# Patient Record
Sex: MALE | Race: WHITE | URBAN - METROPOLITAN AREA
[De-identification: names, ages, dates, MRNs, and addresses within clinical notes are randomized per-mention and may not be internally consistent; named-entity substitution may affect disease eponyms.]

---

## 2023-08-16 ENCOUNTER — OFFICE VISIT (OUTPATIENT)
Dept: FAMILY MEDICINE CLINIC | Facility: CLINIC | Age: 33
End: 2023-08-16

## 2023-08-16 VITALS
WEIGHT: 192 LBS | HEIGHT: 67.5 IN | DIASTOLIC BLOOD PRESSURE: 85 MMHG | SYSTOLIC BLOOD PRESSURE: 128 MMHG | OXYGEN SATURATION: 99 % | BODY MASS INDEX: 29.78 KG/M2 | HEART RATE: 95 BPM | TEMPERATURE: 98 F

## 2023-08-16 DIAGNOSIS — R06.2 WHEEZING: ICD-10-CM

## 2023-08-16 DIAGNOSIS — R05.1 ACUTE COUGH: Primary | ICD-10-CM

## 2023-08-16 PROCEDURE — 3079F DIAST BP 80-89 MM HG: CPT | Performed by: FAMILY MEDICINE

## 2023-08-16 PROCEDURE — 3074F SYST BP LT 130 MM HG: CPT | Performed by: FAMILY MEDICINE

## 2023-08-16 PROCEDURE — 3008F BODY MASS INDEX DOCD: CPT | Performed by: FAMILY MEDICINE

## 2023-08-16 PROCEDURE — 99202 OFFICE O/P NEW SF 15 MIN: CPT | Performed by: FAMILY MEDICINE

## 2023-08-16 RX ORDER — BENZONATATE 100 MG/1
100 CAPSULE ORAL 3 TIMES DAILY PRN
Qty: 30 CAPSULE | Refills: 0 | Status: SHIPPED | OUTPATIENT
Start: 2023-08-16

## 2023-08-16 RX ORDER — ALBUTEROL SULFATE 90 UG/1
2 AEROSOL, METERED RESPIRATORY (INHALATION) EVERY 4 HOURS PRN
Qty: 1 EACH | Refills: 0 | Status: SHIPPED | OUTPATIENT
Start: 2023-08-16

## 2024-04-04 ENCOUNTER — OFFICE VISIT (OUTPATIENT)
Dept: FAMILY MEDICINE CLINIC | Facility: CLINIC | Age: 34
End: 2024-04-04

## 2024-04-04 VITALS
TEMPERATURE: 99 F | WEIGHT: 194 LBS | SYSTOLIC BLOOD PRESSURE: 132 MMHG | HEIGHT: 67.5 IN | HEART RATE: 76 BPM | DIASTOLIC BLOOD PRESSURE: 76 MMHG | BODY MASS INDEX: 30.09 KG/M2

## 2024-04-04 DIAGNOSIS — S39.012A STRAIN OF LUMBAR REGION, INITIAL ENCOUNTER: Primary | ICD-10-CM

## 2024-04-04 PROCEDURE — 3078F DIAST BP <80 MM HG: CPT | Performed by: FAMILY MEDICINE

## 2024-04-04 PROCEDURE — 3075F SYST BP GE 130 - 139MM HG: CPT | Performed by: FAMILY MEDICINE

## 2024-04-04 PROCEDURE — 99213 OFFICE O/P EST LOW 20 MIN: CPT | Performed by: FAMILY MEDICINE

## 2024-04-04 PROCEDURE — 3008F BODY MASS INDEX DOCD: CPT | Performed by: FAMILY MEDICINE

## 2024-04-04 RX ORDER — CYCLOBENZAPRINE HCL 5 MG
5 TABLET ORAL NIGHTLY PRN
Qty: 30 TABLET | Refills: 0 | Status: SHIPPED | OUTPATIENT
Start: 2024-04-04

## 2024-04-04 NOTE — PROGRESS NOTES
HPI:    Patient ID: Gilberto Rutherford is a 33 year old male.      Low Back Pain  Pertinent negatives include no fever, numbness or weakness.       Chief Complaint   Patient presents with    Low Back Pain     X 3 weeks ago possible bulging disk started while stretching his back taking Tylenol        Wt Readings from Last 6 Encounters:   04/04/24 194 lb (88 kg)   08/16/23 192 lb (87.1 kg)     BP Readings from Last 3 Encounters:   04/04/24 132/76   08/16/23 128/85     Low back pain , more on left side and some times radiates to left leg.  Started 3 weeks ago.  Denies any injury.    Works as a technician. Sits at work.  [place] he couldn't stand straight few days ago  Did some back exercises that helped.  Doing heat compresses as well.    Took some tylenol that helped some  Pain currently 3/10    Nu numbness tingling weakness in legs     Review of Systems   Constitutional:  Negative for chills and fever.   Musculoskeletal:  Positive for back pain and myalgias. Negative for arthralgias, gait problem, joint swelling, neck pain and neck stiffness.   Neurological:  Negative for weakness and numbness.       /76   Pulse 76   Temp 99 °F (37.2 °C) (Temporal)   Ht 5' 7.5\" (1.715 m)   Wt 194 lb (88 kg)   BMI 29.94 kg/m²          Current Outpatient Medications   Medication Sig Dispense Refill    cyclobenzaprine 5 MG Oral Tab Take 1 tablet (5 mg total) by mouth nightly as needed. Caution sedation 30 tablet 0     Allergies:No Known Allergies   PHYSICAL EXAM:     Chief Complaint   Patient presents with    Low Back Pain     X 3 weeks ago possible bulging disk started while stretching his back taking Tylenol       Physical Exam  Vitals reviewed.   Musculoskeletal:         General: No swelling, deformity or signs of injury.      Cervical back: Normal range of motion and neck supple.      Lumbar back: Spasms present. No swelling, edema, deformity, signs of trauma, lacerations, tenderness or bony tenderness. Normal range of motion.  Negative right straight leg raise test and negative left straight leg raise test. No scoliosis.      Right lower leg: No edema.      Left lower leg: No edema.      Comments: Mild tenderness lumbar region mid and left lower   Slr neg      Neurological:      Mental Status: He is alert.                ASSESSMENT/PLAN:     Encounter Diagnosis   Name Primary?    Strain of lumbar region, initial encounter Yes       1. Strain of lumbar region, initial encounter  Aleve as needed with food  Start Physical Therapy   Muscle relaxers as needed  Follow up if no better   - cyclobenzaprine 5 MG Oral Tab; Take 1 tablet (5 mg total) by mouth nightly as needed. Caution sedation  Dispense: 30 tablet; Refill: 0  - Physical Therapy Referral - [facility]      No orders of the defined types were placed in this encounter.        The above note was creating using Dragon speech recognition technology. Please excuse any typos    Meds This Visit:  Requested Prescriptions     Signed Prescriptions Disp Refills    cyclobenzaprine 5 MG Oral Tab 30 tablet 0     Sig: Take 1 tablet (5 mg total) by mouth nightly as needed. Caution sedation       Imaging & Referrals:  PHYSICAL THERAPY - INTERNAL       ID#[id number]

## 2024-05-06 ENCOUNTER — TELEPHONE (OUTPATIENT)
Dept: PHYSICAL THERAPY | Facility: HOSPITAL | Age: 34
End: 2024-05-06

## 2024-05-06 ENCOUNTER — OFFICE VISIT (OUTPATIENT)
Dept: PHYSICAL THERAPY | Age: 34
End: 2024-05-06
Attending: FAMILY MEDICINE
Payer: COMMERCIAL

## 2024-05-06 DIAGNOSIS — S39.012A STRAIN OF LUMBAR REGION, INITIAL ENCOUNTER: Primary | ICD-10-CM

## 2024-05-06 PROCEDURE — 97110 THERAPEUTIC EXERCISES: CPT

## 2024-05-06 PROCEDURE — 97161 PT EVAL LOW COMPLEX 20 MIN: CPT

## 2024-05-06 NOTE — PROGRESS NOTES
P.T. EVALUATION:   Referring Physician: Dr. Hackett  Diagnosis: Strain of lumbar region, initial encounter (S39.012A)    Date of Onset: 2024  Date of Service: 2024     PATIENT SUMMARY   Gilberto Rutherford is a 33 year old y/o male who presents to therapy today with complaints of low back pain  Pt describes pain level 1-2/10   History of current condition: Patient reports approximately two to three months ago he was stretching and injured his back.  Patient reports history of back pain on and off in the past.  He reports no hx of receiving PT services.  Current functional limitations include sitting, bending, lifting, standing, caring for his   Gilberto describes prior level of function independent  Pt goals include pain relief and return to prior level of function  Past medical history was reviewed with Gilberto. Patient  has no past medical history on file. Other co-morbidities include none right shoulder pain  Social hx: Pt is currently off work caring for his .       ASSESSMENT:   Patient presents to PT clinic due to low back pain beginning three months ago.  Patient demos slight decreased lumbar ROM, decreased LE flexibility, decreased hip strength, and pain.  These impairments are functionally limiting pt's ability to sit, bend, lift, stand, and care for his .  Gilberto would benefit from skilled Physical Therapy to address the above impairments to relieve pain and return to prior level of function.      Precautions:  None     OBJECTIVE:   Observation:  pt ambulates into clinic independently    Sensation: normal LE sensation    AROM:   Lumbar ROM:  Flx: Min loss (no pain)  Ext: WNL  Rot: R WNL, L WNL (slight pain)  Lat flx: R min loss/WNL (pain), L WNL     Accessory motion:   PA assessment:  Lumbar spine: slight hypomobility noted at L5 spinous process    Flexibility:   HS: R min loss, L mod loss  Quads: R min loss, L min loss    Strength/MMT:   Hip flx: B 5/5  Hip abd: R 4+/5, L 4+/5  Hip  ext: R 5/5, L 4+/5  Knee ext: B 5/5  Knee flx: B 5/5  Ankle df: B 5/5    Posture: good sitting and standing posture    Gait: pt ambulates into clinic independently; no obvious deviations noted    Today’s Treatment and Response:  Patient education provided on PT eval findings, treatment plan, goals, HEP  Patient received there ex, HEP  Charges: PT Eval, TE 2      Total Timed Treatment: 45 min     Total Treatment Time: 45 min     Modified Oswestry Low Back Pain Questionnaire: 10%     PT Eval Low Complexity     PLAN OF CARE:    Goals:    1- Pt will be I with maintenance and progression of HEP  2- Pt will demo 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 Treatment will include: Modalities prn, manual therapy, therapeutic exercises, therapeutic activity, and instructions on a home program.     Education or treatment limitation: None  Rehab Potential:good    Patient was advised of these findings, precautions, and treatment options and has agreed to actively participate in planning and for this course of care.    Thank you for your referral. Please co-sign or sign and return this letter via fax as soon as possible to [phone]. If you have any questions, please contact me at Dept: [phone]    Sincerely,  Electronically signed by therapist: Kesha Bobby PT, DPT    Physician's certification required: Yes  I certify the need for these services furnished under this plan of treatment and while under my care.    X___________________________________________________ Date____________________    Certification From: 5/6/2024  To:8/4/2024

## 2024-05-08 ENCOUNTER — OFFICE VISIT (OUTPATIENT)
Dept: PHYSICAL THERAPY | Age: 34
End: 2024-05-08
Attending: FAMILY MEDICINE
Payer: COMMERCIAL

## 2024-05-08 PROCEDURE — 97110 THERAPEUTIC EXERCISES: CPT

## 2024-05-08 NOTE — PROGRESS NOTES
Diagnosis: Strain of lumbar region, initial encounter (S39.012A)       Next MD visit: none scheduled  Fall Risk: standard         Precautions: n/a          Medication Changes since last visit?: No    Subjective: Patient reports 1-2/10 low back pain today.  He reports it is a little more sore.  He reports no hip/buttock pain.        Objective:     Date: 5/8/2024  Visit #: 2 ([employer] P[employer])   HEP   -    Therapeutic Exercise   X 43 minutes  (*pillow under stomach for all exercises in prone)  - supine R/L LTR 10x ea  - prone R/L hip extension with knee flexion 2 x 10 ea  - prone R/L quad stretch by clinician 3 x 20 sec holds ea  - PPT 2 x 10 5 sec holds  - R/L piriformis stretch 3 x 20 sec holds ea  - dead bug 10x  - supine bridges 2 x 10  - supine R/L SL bridges 2 x 5 reps ea  - standing B gastroc stretch on slant board level 4 3 x 30 sec holds   Manual Therapy   -    Therapeutic Activity   -   Modalities   -             Assessment: Session focused on gluteal and core stabilization therapeutic exercises.  Continued with LE stretching as well.      Plan: continue PT    Charges: Ex 3       Total Timed Treatment: 43 min  Total Treatment Time: 43 min

## 2024-05-13 ENCOUNTER — OFFICE VISIT (OUTPATIENT)
Dept: PHYSICAL THERAPY | Age: 34
End: 2024-05-13
Attending: FAMILY MEDICINE
Payer: COMMERCIAL

## 2024-05-13 PROCEDURE — 97110 THERAPEUTIC EXERCISES: CPT

## 2024-05-13 NOTE — PROGRESS NOTES
Diagnosis: Strain of lumbar region, initial encounter (S39.012A)       Next MD visit: none scheduled  Fall Risk: standard         Precautions: n/a          Medication Changes since last visit?: No    Subjective: Patient reports 1/10 low back pain.  He reports sometimes if he moves a certain way he will feel it.        Objective:     Date: 5/13/2024  Visit #: 3 ([employer] P[employer]) Date: 5/8/2024  Visit #: 2 ([employer] P[employer])   HEP    -    Therapeutic Exercise   X 38 minutes  - supine R/L hamstring stretch with strap 3 x 30 sec holds ea  - dead bug with alternating heel taps 10x  - dead bug 2 x 10  - supine R/L LTR 10x ea  - R/L piriformis stretch 3 x 20 sec holds ea  - supine SB bridges 2 x 10  - supine R/L SL bridges 10x ea  - PPU 5 reps   - standing B gastroc stretch on slant board level 4 3 x 30 sec holds  - R/L farmers march with 20# KB 2 x 10  - standing squats with KB at chest 10x X 43 minutes  (*pillow under stomach for all exercises in prone)  - supine R/L LTR 10x ea  - prone R/L hip extension with knee flexion 2 x 10 ea  - prone R/L quad stretch by clinician 3 x 20 sec holds ea  - PPT 2 x 10 5 sec holds  - R/L piriformis stretch 3 x 20 sec holds ea  - dead bug 10x  - supine bridges 2 x 10  - supine R/L SL bridges 2 x 5 reps ea  - standing B gastroc stretch on slant board level 4 3 x 30 sec holds   Manual Therapy   - prone lumbar spine mobilizations grade 3 x 6 minutes -    Therapeutic Activity    -   Modalities   - heat pad to low back in prone x 10 minutes at end of session -              Assessment:  Progressed core stabilization and gluteal exercises with initiated of squats and farmers carry march.  Continued with gentle LE stretching as well.        Plan: continue PT    Charges: Ex 3        Total Timed Treatment: 44 min  Total Treatment Time: 54 min

## 2024-05-15 ENCOUNTER — OFFICE VISIT (OUTPATIENT)
Dept: PHYSICAL THERAPY | Age: 34
End: 2024-05-15
Attending: FAMILY MEDICINE
Payer: COMMERCIAL

## 2024-05-15 PROCEDURE — 97110 THERAPEUTIC EXERCISES: CPT

## 2024-05-15 NOTE — PROGRESS NOTES
Diagnosis: Strain of lumbar region, initial encounter (S39.012A)       Next MD visit: none scheduled  Fall Risk: standard         Precautions: n/a          Medication Changes since last visit?: No    Subjective:  Patient reports just some soreness in his back currently, but states he will feel 3/10 low back pain with pelvic tilt movement when lying on his back.  He reports the pain is located in the center of his low back.        Objective:     Date: 5/15/2024  Visit #: 4 ([employer] P[employer]) Date: 5/13/2024  Visit #: 3 ([employer] P[employer]) Date: 5/8/2024  Visit #: 2 ([employer] P[employer])   HEP   - updated HEP - see pt instructions section  -    Therapeutic Exercise   X 43 minutes  - supine R/L hamstring stretch with strap 3 x 30 sec holds ea  - supine SB bridges 2 x 10  - supine R/L SKTC 5x ea 5 sec holds  - dead bug with 5# DBs 2 x 10  - R/L piriformis stretch 3 x 20 sec holds ea  - supine SB bridges 2 x 10  - supine R/L SL bridges 10x ea  - sidelying R/L clams with GTB above knees 1 x 10 ea 5 sec holds  - PPU 2 x 5 reps   - LEON 2 minutes  - prone alternating superman 1 x 10  - prone R/L hip extension 3 x 10 ea  - standing B gastroc stretch on slant board level 4 3 x 30 sec holds  - R/L kneeling hip flexor stretch on airex 2 x 20 sec holds ea  - seated LB stretch with SB to R/center/L 3x ea 10 sec holds ea  - standing squats with  20# KB at chest 2 x 10 X 38 minutes  - supine R/L hamstring stretch with strap 3 x 30 sec holds ea  - dead bug with alternating heel taps 10x  - dead bug 2 x 10  - supine R/L LTR 10x ea  - R/L piriformis stretch 3 x 20 sec holds ea  - supine SB bridges 2 x 10  - supine R/L SL bridges 10x ea  - PPU 5 reps   - standing B gastroc stretch on slant board level 4 3 x 30 sec holds  - R/L farmers march with 20# KB 2 x 10  - standing squats with 20# KB at chest 10x X 43 minutes  (*pillow under stomach for all exercises in prone)  - supine R/L LTR 10x ea  - prone R/L hip extension with knee flexion 2 x 10 ea  - prone R/L quad  stretch by clinician 3 x 20 sec holds ea  - PPT 2 x 10 5 sec holds  - R/L piriformis stretch 3 x 20 sec holds ea  - dead bug 10x  - supine bridges 2 x 10  - supine R/L SL bridges 2 x 5 reps ea  - standing B gastroc stretch on slant board level 4 3 x 30 sec holds   Manual Therapy    - prone lumbar spine mobilizations grade 3 x 6 minutes -    Therapeutic Activity     -   Modalities   - heat pad to low back in prone x 10 minutes at end of session - heat pad to low back in prone x 10 minutes at end of session -               Assessment:  Advanced gluteal/hip strengthening exercises and initiated additional lumbar stretching.       Plan: continue PT    Charges: Ex 3        Total Timed Treatment: 43 min  Total Treatment Time: 53 min

## 2024-05-20 ENCOUNTER — APPOINTMENT (OUTPATIENT)
Dept: PHYSICAL THERAPY | Age: 34
End: 2024-05-20
Attending: FAMILY MEDICINE
Payer: COMMERCIAL

## 2024-05-22 ENCOUNTER — APPOINTMENT (OUTPATIENT)
Dept: PHYSICAL THERAPY | Age: 34
End: 2024-05-22
Attending: FAMILY MEDICINE
Payer: COMMERCIAL

## 2024-05-29 ENCOUNTER — APPOINTMENT (OUTPATIENT)
Dept: PHYSICAL THERAPY | Age: 34
End: 2024-05-29
Attending: FAMILY MEDICINE
Payer: COMMERCIAL

## 2024-06-03 ENCOUNTER — APPOINTMENT (OUTPATIENT)
Dept: PHYSICAL THERAPY | Age: 34
End: 2024-06-03
Attending: FAMILY MEDICINE
Payer: COMMERCIAL